# Patient Record
Sex: FEMALE | Race: WHITE | Employment: FULL TIME | URBAN - METROPOLITAN AREA
[De-identification: names, ages, dates, MRNs, and addresses within clinical notes are randomized per-mention and may not be internally consistent; named-entity substitution may affect disease eponyms.]

---

## 2022-02-04 ENCOUNTER — LAB REQUISITION (OUTPATIENT)
Dept: LAB | Facility: CLINIC | Age: 62
End: 2022-02-04
Payer: COMMERCIAL

## 2022-02-04 DIAGNOSIS — L63.9 ALOPECIA AREATA, UNSPECIFIED: ICD-10-CM

## 2022-02-04 LAB
BASOPHILS # BLD AUTO: 0 10E3/UL (ref 0–0.2)
BASOPHILS NFR BLD AUTO: 0 %
EOSINOPHIL # BLD AUTO: 0.1 10E3/UL (ref 0–0.7)
EOSINOPHIL NFR BLD AUTO: 2 %
ERYTHROCYTE [DISTWIDTH] IN BLOOD BY AUTOMATED COUNT: 11.5 % (ref 10–15)
FERRITIN SERPL-MCNC: 115 NG/ML (ref 8–252)
HCT VFR BLD AUTO: 41.8 % (ref 35–47)
HGB BLD-MCNC: 13.8 G/DL (ref 11.7–15.7)
IMM GRANULOCYTES # BLD: 0 10E3/UL
IMM GRANULOCYTES NFR BLD: 0 %
IRON SATN MFR SERPL: 43 % (ref 15–46)
IRON SERPL-MCNC: 123 UG/DL (ref 35–180)
LYMPHOCYTES # BLD AUTO: 1.7 10E3/UL (ref 0.8–5.3)
LYMPHOCYTES NFR BLD AUTO: 37 %
MCH RBC QN AUTO: 31.7 PG (ref 26.5–33)
MCHC RBC AUTO-ENTMCNC: 33 G/DL (ref 31.5–36.5)
MCV RBC AUTO: 96 FL (ref 78–100)
MONOCYTES # BLD AUTO: 0.3 10E3/UL (ref 0–1.3)
MONOCYTES NFR BLD AUTO: 6 %
NEUTROPHILS # BLD AUTO: 2.5 10E3/UL (ref 1.6–8.3)
NEUTROPHILS NFR BLD AUTO: 55 %
NRBC # BLD AUTO: 0 10E3/UL
NRBC BLD AUTO-RTO: 0 /100
PLATELET # BLD AUTO: 280 10E3/UL (ref 150–450)
RBC # BLD AUTO: 4.35 10E6/UL (ref 3.8–5.2)
T4 FREE SERPL-MCNC: 1.02 NG/DL (ref 0.76–1.46)
TIBC SERPL-MCNC: 287 UG/DL (ref 240–430)
TSH SERPL DL<=0.005 MIU/L-ACNC: 1.56 MU/L (ref 0.4–4)
WBC # BLD AUTO: 4.6 10E3/UL (ref 4–11)

## 2022-02-04 PROCEDURE — 83550 IRON BINDING TEST: CPT | Mod: ORL | Performed by: DERMATOLOGY

## 2022-02-04 PROCEDURE — 84403 ASSAY OF TOTAL TESTOSTERONE: CPT | Mod: ORL | Performed by: DERMATOLOGY

## 2022-02-04 PROCEDURE — 85025 COMPLETE CBC W/AUTO DIFF WBC: CPT | Mod: ORL | Performed by: DERMATOLOGY

## 2022-02-04 PROCEDURE — 82728 ASSAY OF FERRITIN: CPT | Mod: ORL | Performed by: DERMATOLOGY

## 2022-02-04 PROCEDURE — 84443 ASSAY THYROID STIM HORMONE: CPT | Mod: ORL | Performed by: DERMATOLOGY

## 2022-02-04 PROCEDURE — 36415 COLL VENOUS BLD VENIPUNCTURE: CPT | Mod: ORL | Performed by: DERMATOLOGY

## 2022-02-04 PROCEDURE — 84270 ASSAY OF SEX HORMONE GLOBUL: CPT | Mod: ORL | Performed by: DERMATOLOGY

## 2022-02-04 PROCEDURE — 82306 VITAMIN D 25 HYDROXY: CPT | Mod: ORL | Performed by: DERMATOLOGY

## 2022-02-04 PROCEDURE — 84439 ASSAY OF FREE THYROXINE: CPT | Mod: ORL | Performed by: DERMATOLOGY

## 2022-02-04 PROCEDURE — 82627 DEHYDROEPIANDROSTERONE: CPT | Mod: ORL | Performed by: DERMATOLOGY

## 2022-02-06 LAB
DEPRECATED CALCIDIOL+CALCIFEROL SERPL-MC: 45 UG/L (ref 20–75)
SHBG SERPL-SCNC: 145 NMOL/L (ref 30–135)

## 2022-02-07 LAB — DHEA-S SERPL-MCNC: 67 UG/DL (ref 35–430)

## 2022-02-08 LAB
TESTOST FREE SERPL-MCNC: 0.1 NG/DL
TESTOST SERPL-MCNC: 16 NG/DL (ref 8–60)

## 2022-03-21 ENCOUNTER — OFFICE VISIT (OUTPATIENT)
Dept: SURGERY | Facility: CLINIC | Age: 62
End: 2022-03-21
Payer: COMMERCIAL

## 2022-03-21 VITALS — HEART RATE: 107 BPM | DIASTOLIC BLOOD PRESSURE: 70 MMHG | SYSTOLIC BLOOD PRESSURE: 110 MMHG

## 2022-03-21 DIAGNOSIS — D17.21 LIPOMA OF RIGHT UPPER EXTREMITY: Primary | ICD-10-CM

## 2022-03-21 PROCEDURE — 99242 OFF/OP CONSLTJ NEW/EST SF 20: CPT | Performed by: SURGERY

## 2022-03-22 ENCOUNTER — TELEPHONE (OUTPATIENT)
Dept: SURGERY | Facility: CLINIC | Age: 62
End: 2022-03-22
Payer: COMMERCIAL

## 2022-03-22 DIAGNOSIS — Z11.59 ENCOUNTER FOR SCREENING FOR OTHER VIRAL DISEASES: Primary | ICD-10-CM

## 2022-03-22 NOTE — TELEPHONE ENCOUNTER
Type of surgery: Excision right forearm lipoma, excision left buttock lipoma  Location of surgery: [facility]  Date and time of surgery: 4/15/22 at 11am  Surgeon: Dr. Wilbert Lima  Pre-Op Appt Date: Patient to schedule  Post-Op Appt Date: [facility] to [facility]   Packet sent out: Yes  Pre-cert/Authorization completed:  Not Applicable  Date: 3/22/22

## 2022-03-24 NOTE — PROGRESS NOTES
[facility]  Surgery Consultation    PCP:  Ila Juares [phone]  Consultation requested by: Karlee Lipscomb MD    HPI: Patient is a 62-year-old female referred by the above provider for consultation regarding recurrent lipoma of the left buttock as well as lipoma on the right arm.  She reports that the one in the buttock had been previously removed many years ago but has since recurred.  Is gotten more protuberant.  He also has a growing lipoma on the right forearm.  These cause occasional discomfort when bumped.  She is also concerned about their increases in size.    PMH:   has no past medical history on file.  PSH:    has no past surgical history on file.  Social History:   reports that she has never smoked. She has never used smokeless tobacco. She reports that she does not use drugs.  Family History:   family history is not on file.  Medications/Allergies: Home medications and allergies reviewed.    ROS:  The 10 point Review of Systems is negative other than noted in the HPI.    Physical Exam:  /70   Pulse 107   GENERAL: Generally appears well.  Psych: Alert and Oriented.  Normal affect  Eyes: Sclera clear  Respiratory:  Lungs clear to ausculation bilaterally with good air excursion  Cardiovascular:  Regular Rate and Rhythm with no murmurs gallops or rubs, normal peripheral pulses  GI: Abdomen Non Distended Soft   .  Lymphatic/Hematologic/Immune:  No femoral or cervical lymphadenopathy.  Integumentary:  No rashes, in the medial left buttock there is a well-circumscribed relatively protuberant abnormality consistent with recurrent lipoma.  There is a much smaller obvious lipoma on the right elbow area.  These are both freely mobile.  Neurological: grossly intact    All new lab and imaging data was reviewed.     Impression and Plan:  Patient is a 62 year old female with lipomas as described    PLAN: We discussed management options.  Recommend excision.  This will be  scheduled at a date of her convenience.  Risks, benefits and recovery were reviewed.  She is interested in proceeding and this will be scheduled at her convenience.      Thank you very much for this consult.    Wilbert Lima M.D.  [facility]  [phone]    Please route or send letter to:  Primary Care Provider (PCP) and Referring Provider

## 2022-04-14 ENCOUNTER — LAB (OUTPATIENT)
Dept: URGENT CARE | Facility: URGENT CARE | Age: 62
End: 2022-04-14
Attending: SURGERY
Payer: COMMERCIAL

## 2022-04-14 DIAGNOSIS — Z11.59 ENCOUNTER FOR SCREENING FOR OTHER VIRAL DISEASES: ICD-10-CM

## 2022-04-14 PROCEDURE — U0005 INFEC AGEN DETEC AMPLI PROBE: HCPCS

## 2022-04-14 PROCEDURE — U0003 INFECTIOUS AGENT DETECTION BY NUCLEIC ACID (DNA OR RNA); SEVERE ACUTE RESPIRATORY SYNDROME CORONAVIRUS 2 (SARS-COV-2) (CORONAVIRUS DISEASE [COVID-19]), AMPLIFIED PROBE TECHNIQUE, MAKING USE OF HIGH THROUGHPUT TECHNOLOGIES AS DESCRIBED BY CMS-2020-01-R: HCPCS

## 2022-04-15 LAB — SARS-COV-2 RNA RESP QL NAA+PROBE: NEGATIVE

## 2022-04-17 RX ORDER — LIDOCAINE HYDROCHLORIDE 10 MG/ML
10 INJECTION, SOLUTION EPIDURAL; INFILTRATION; INTRACAUDAL; PERINEURAL ONCE
Status: CANCELLED | OUTPATIENT
Start: 2022-04-17

## 2022-04-18 ENCOUNTER — HOSPITAL ENCOUNTER (OUTPATIENT)
Facility: CLINIC | Age: 62
Discharge: HOME | End: 2022-04-18
Attending: SURGERY | Admitting: SURGERY
Payer: COMMERCIAL

## 2022-04-18 VITALS
DIASTOLIC BLOOD PRESSURE: 74 MMHG | SYSTOLIC BLOOD PRESSURE: 136 MMHG | RESPIRATION RATE: 16 BRPM | OXYGEN SATURATION: 98 %

## 2022-04-18 PROCEDURE — 24075 EXC ARM/ELBOW LES SC < 3 CM: CPT | Mod: RT | Performed by: SURGERY

## 2022-04-18 PROCEDURE — 21931 EXC BACK LES SC 3 CM/>: CPT | Mod: 51 | Performed by: SURGERY

## 2022-04-18 PROCEDURE — 11406 EXC TR-EXT B9+MARG >4.0 CM: CPT | Mod: 59

## 2022-04-18 PROCEDURE — 12034 INTMD RPR S/TR/EXT 7.6-12.5: CPT

## 2022-04-18 PROCEDURE — 11403 EXC TR-EXT B9+MARG 2.1-3CM: CPT | Performed by: SURGERY

## 2022-04-18 PROCEDURE — 250N000009 HC RX 250: Performed by: SURGERY

## 2022-04-18 PROCEDURE — 88304 TISSUE EXAM BY PATHOLOGIST: CPT | Mod: TC | Performed by: SURGERY

## 2022-04-18 PROCEDURE — 11402 EXC TR-EXT B9+MARG 1.1-2 CM: CPT | Performed by: SURGERY

## 2022-04-18 RX ORDER — LIDOCAINE HYDROCHLORIDE AND EPINEPHRINE 10; 10 MG/ML; UG/ML
INJECTION, SOLUTION INFILTRATION; PERINEURAL PRN
Status: DISCONTINUED | OUTPATIENT
Start: 2022-04-18 | End: 2022-04-18 | Stop reason: HOSPADM

## 2022-04-18 RX ADMIN — LIDOCAINE HYDROCHLORIDE AND EPINEPHRINE 8 ML: 10; 10 INJECTION, SOLUTION INFILTRATION; PERINEURAL at 10:52

## 2022-04-18 RX ADMIN — LIDOCAINE HYDROCHLORIDE AND EPINEPHRINE 0.5 ML: 10; 10 INJECTION, SOLUTION INFILTRATION; PERINEURAL at 10:50

## 2022-04-18 NOTE — OP NOTE
Preoperative diagnosis: Right elbow lipoma, left buttock lipoma    Postoperative diagnosis: Same, right elbow lipoma 2 cm in greatest diameter, left buttock lipoma 8 cm in greatest diameter    Procedure: Excision of right elbow lipoma, excision of left buttock lipoma    Surgeon: Wilbert Lima MD    Anesthesia: Local    Estimated blood loss: 2 cc    Specimens: Right elbow lipoma examined and discarded with patient consent, left buttock lipoma sent for permanent evaluation due to palpable firm nodule centralized within the mass.    Indication for procedure: This is a 62-year-old female who presented my office in consultation for a left buttock mass as well as a right forearm mass.  She reports that the left buttock mass had undergone attempted excision but had recurred.  She feels that it ultimately did not change significantly when the previous procedure had been performed.  The mass on the right elbow was quite obviously a benign-appearing lipoma.  We discussed management options.  It was elected proceed with excision.  The potential risks of bleeding, infection, recurrence were discussed.  Patient's questions were answered she consented to proceed.    Procedure: After informed consent was obtained the patient was taken to one of the endoscopy suites at [facility].  She was placed supine on the procedure table.  Starting on the right elbow the area in question was sterilely prepped and draped in usual manner.  1% lidocaine with epinephrine was then used to create a field block.  Transverse incision was made overlying the palpable mass.  A benign-appearing well-circumscribed lipoma was easily delivered through the incision.  The incision was then closed with interrupted deep dermal 3-0 Monocryl sutures.  Mastisol and Steri-Strips were applied.    Attention was then turned to the left buttock lipoma.  The patient was placed in a right-side-down decubitus position and the area in question was  sterilely prepped and draped in usual manner.  Again 1% lidocaine with epinephrine was used to create a field block.  Elliptical incision was made around and including the old scar.  Dissection was carried in the subcutaneous tissues.  There was moderate scarring in the subcutaneous tissues from the prior procedure.  Eventually as I dissected deeper I encountered a multilobulated well-circumscribed lipoma that was mobilized primarily with blunt dissection and then ultimately excised.  Heater probe electrocautery was used for assurance of hemostasis.  The deep subcu was approximated with 3-0 Vicryl suture.  The skin incision itself was closed with interrupted deep dermal 3-0 Monocryl sutures.  Mastisol and Steri-Strips were applied.  Patient overall tolerated this without difficulty.  She left in a stable and ambulatory condition.    Wilbert Lima MD

## 2022-04-19 LAB
PATH REPORT.COMMENTS IMP SPEC: NORMAL
PATH REPORT.COMMENTS IMP SPEC: NORMAL
PATH REPORT.FINAL DX SPEC: NORMAL
PATH REPORT.GROSS SPEC: NORMAL
PATH REPORT.MICROSCOPIC SPEC OTHER STN: NORMAL
PATH REPORT.RELEVANT HX SPEC: NORMAL
PHOTO IMAGE: NORMAL

## 2022-04-19 PROCEDURE — 88304 TISSUE EXAM BY PATHOLOGIST: CPT | Mod: 26 | Performed by: PATHOLOGY

## 2022-04-25 ENCOUNTER — TELEPHONE (OUTPATIENT)
Dept: SURGERY | Facility: CLINIC | Age: 62
End: 2022-04-25
Payer: COMMERCIAL

## 2022-04-25 NOTE — TELEPHONE ENCOUNTER
Procedure: Excision of right elbow lipoma, excision of left buttock lipoma    Date: 04/18/2022    Surgeon: Clarence    Called patient to discuss pathology findings    Final Diagnosis: benign lobulated adipose tissue compatible with benign lipoma  Negative for malignancy    Patient verbalized understanding of this. No further questions at this time    She does note small amount of drainage.      No further concerns.     She will call ILA Naidu RN-BSN

## (undated) DEVICE — ESU EYE HIGH TEMP 65410-183

## (undated) DEVICE — SU VICRYL 3-0 SH 27" J316H

## (undated) DEVICE — SU MONOCRYL 3-0 PS-2 18" UND Y497G

## (undated) DEVICE — ADH LIQUID MASTISOL TOPICAL VIAL 2-3ML 0523-48

## (undated) DEVICE — PREP CHLORAPREP W/ORANGE TINT 10.5ML 930715

## (undated) DEVICE — DRSG STERI STRIP 1/2X4" R1547